# Patient Record
Sex: MALE | Race: WHITE | NOT HISPANIC OR LATINO | Employment: OTHER | URBAN - METROPOLITAN AREA
[De-identification: names, ages, dates, MRNs, and addresses within clinical notes are randomized per-mention and may not be internally consistent; named-entity substitution may affect disease eponyms.]

---

## 2021-02-11 ENCOUNTER — TELEMEDICINE (OUTPATIENT)
Dept: INTERNAL MEDICINE | Facility: CLINIC | Age: 66
End: 2021-02-11

## 2021-02-11 VITALS
SYSTOLIC BLOOD PRESSURE: 139 MMHG | WEIGHT: 215 LBS | BODY MASS INDEX: 26.73 KG/M2 | HEIGHT: 75 IN | DIASTOLIC BLOOD PRESSURE: 90 MMHG

## 2021-02-11 DIAGNOSIS — I10 ESSENTIAL HYPERTENSION: ICD-10-CM

## 2021-02-11 DIAGNOSIS — R14.0 BLOATING: ICD-10-CM

## 2021-02-11 DIAGNOSIS — Z86.19 HISTORY OF SHINGLES: ICD-10-CM

## 2021-02-11 DIAGNOSIS — F32.1 MAJOR DEPRESSIVE DISORDER, SINGLE EPISODE, MODERATE (HCC): ICD-10-CM

## 2021-02-11 DIAGNOSIS — Z11.59 ENCOUNTER FOR HEPATITIS C SCREENING TEST FOR LOW RISK PATIENT: ICD-10-CM

## 2021-02-11 DIAGNOSIS — Z13.1 SCREENING FOR DIABETES MELLITUS: ICD-10-CM

## 2021-02-11 DIAGNOSIS — K21.9 GASTROESOPHAGEAL REFLUX DISEASE WITHOUT ESOPHAGITIS: ICD-10-CM

## 2021-02-11 DIAGNOSIS — N52.9 ERECTILE DYSFUNCTION, UNSPECIFIED ERECTILE DYSFUNCTION TYPE: ICD-10-CM

## 2021-02-11 DIAGNOSIS — Z13.220 SCREENING, LIPID: ICD-10-CM

## 2021-02-11 DIAGNOSIS — F41.9 ANXIETY: Primary | ICD-10-CM

## 2021-02-11 PROCEDURE — 99204 OFFICE O/P NEW MOD 45 MIN: CPT | Performed by: INTERNAL MEDICINE

## 2021-02-11 RX ORDER — SILDENAFIL CITRATE 20 MG/1
20 TABLET ORAL DAILY PRN
COMMUNITY
End: 2021-03-11 | Stop reason: SDUPTHER

## 2021-02-11 RX ORDER — OMEPRAZOLE 20 MG/1
1 CAPSULE, DELAYED RELEASE ORAL DAILY
COMMUNITY
End: 2021-03-11 | Stop reason: SDUPTHER

## 2021-02-11 RX ORDER — SERTRALINE HYDROCHLORIDE 25 MG/1
25 TABLET, FILM COATED ORAL DAILY
Qty: 30 TABLET | Refills: 1 | Status: SHIPPED | OUTPATIENT
Start: 2021-02-11 | End: 2021-03-11 | Stop reason: SDUPTHER

## 2021-02-11 RX ORDER — LISINOPRIL AND HYDROCHLOROTHIAZIDE 25; 20 MG/1; MG/1
1 TABLET ORAL DAILY
COMMUNITY
End: 2021-03-11 | Stop reason: SDUPTHER

## 2021-02-11 RX ORDER — MULTIVIT-MIN/IRON/FOLIC ACID/K 18-600-40
1 CAPSULE ORAL DAILY
COMMUNITY

## 2021-03-11 ENCOUNTER — LAB (OUTPATIENT)
Dept: LAB | Facility: HOSPITAL | Age: 66
End: 2021-03-11

## 2021-03-11 ENCOUNTER — OFFICE VISIT (OUTPATIENT)
Dept: INTERNAL MEDICINE | Facility: CLINIC | Age: 66
End: 2021-03-11

## 2021-03-11 VITALS
HEART RATE: 80 BPM | OXYGEN SATURATION: 99 % | WEIGHT: 211 LBS | DIASTOLIC BLOOD PRESSURE: 80 MMHG | SYSTOLIC BLOOD PRESSURE: 144 MMHG | RESPIRATION RATE: 16 BRPM | TEMPERATURE: 97.1 F | HEIGHT: 75 IN | BODY MASS INDEX: 26.24 KG/M2

## 2021-03-11 DIAGNOSIS — N52.2 DRUG-INDUCED ERECTILE DYSFUNCTION: ICD-10-CM

## 2021-03-11 DIAGNOSIS — F32.1 MAJOR DEPRESSIVE DISORDER, SINGLE EPISODE, MODERATE (HCC): ICD-10-CM

## 2021-03-11 DIAGNOSIS — Z13.1 SCREENING FOR DIABETES MELLITUS: ICD-10-CM

## 2021-03-11 DIAGNOSIS — K21.9 GASTROESOPHAGEAL REFLUX DISEASE WITHOUT ESOPHAGITIS: ICD-10-CM

## 2021-03-11 DIAGNOSIS — I10 ESSENTIAL HYPERTENSION: ICD-10-CM

## 2021-03-11 DIAGNOSIS — Z11.59 ENCOUNTER FOR HEPATITIS C SCREENING TEST FOR LOW RISK PATIENT: ICD-10-CM

## 2021-03-11 DIAGNOSIS — F41.9 ANXIETY: ICD-10-CM

## 2021-03-11 DIAGNOSIS — Z13.220 SCREENING, LIPID: ICD-10-CM

## 2021-03-11 LAB
ALBUMIN SERPL-MCNC: 4.5 G/DL (ref 3.5–5.2)
ALBUMIN/GLOB SERPL: 1.6 G/DL
ALP SERPL-CCNC: 64 U/L (ref 39–117)
ALT SERPL W P-5'-P-CCNC: 18 U/L (ref 1–41)
ANION GAP SERPL CALCULATED.3IONS-SCNC: 8.5 MMOL/L (ref 5–15)
AST SERPL-CCNC: 12 U/L (ref 1–40)
BILIRUB SERPL-MCNC: 0.5 MG/DL (ref 0–1.2)
BUN SERPL-MCNC: 17 MG/DL (ref 8–23)
BUN/CREAT SERPL: 17.9 (ref 7–25)
CALCIUM SPEC-SCNC: 9.3 MG/DL (ref 8.6–10.5)
CHLORIDE SERPL-SCNC: 100 MMOL/L (ref 98–107)
CHOLEST SERPL-MCNC: 192 MG/DL (ref 0–200)
CO2 SERPL-SCNC: 26.5 MMOL/L (ref 22–29)
CREAT SERPL-MCNC: 0.95 MG/DL (ref 0.76–1.27)
DEPRECATED RDW RBC AUTO: 39.8 FL (ref 37–54)
ERYTHROCYTE [DISTWIDTH] IN BLOOD BY AUTOMATED COUNT: 12.3 % (ref 12.3–15.4)
GFR SERPL CREATININE-BSD FRML MDRD: 80 ML/MIN/1.73
GLOBULIN UR ELPH-MCNC: 2.8 GM/DL
GLUCOSE SERPL-MCNC: 97 MG/DL (ref 65–99)
HBA1C MFR BLD: 5.51 % (ref 4.8–5.6)
HCT VFR BLD AUTO: 45.6 % (ref 37.5–51)
HDLC SERPL-MCNC: 42 MG/DL (ref 40–60)
HGB BLD-MCNC: 16.1 G/DL (ref 13–17.7)
LDLC SERPL CALC-MCNC: 138 MG/DL (ref 0–100)
LDLC/HDLC SERPL: 3.28 {RATIO}
MCH RBC QN AUTO: 31.8 PG (ref 26.6–33)
MCHC RBC AUTO-ENTMCNC: 35.3 G/DL (ref 31.5–35.7)
MCV RBC AUTO: 89.9 FL (ref 79–97)
PLATELET # BLD AUTO: 276 10*3/MM3 (ref 140–450)
PMV BLD AUTO: 9.8 FL (ref 6–12)
POTASSIUM SERPL-SCNC: 4.5 MMOL/L (ref 3.5–5.2)
PROT SERPL-MCNC: 7.3 G/DL (ref 6–8.5)
RBC # BLD AUTO: 5.07 10*6/MM3 (ref 4.14–5.8)
SODIUM SERPL-SCNC: 135 MMOL/L (ref 136–145)
TRIGL SERPL-MCNC: 62 MG/DL (ref 0–150)
VLDLC SERPL-MCNC: 12 MG/DL (ref 5–40)
WBC # BLD AUTO: 5.7 10*3/MM3 (ref 3.4–10.8)

## 2021-03-11 PROCEDURE — 80061 LIPID PANEL: CPT

## 2021-03-11 PROCEDURE — 80053 COMPREHEN METABOLIC PANEL: CPT

## 2021-03-11 PROCEDURE — 86803 HEPATITIS C AB TEST: CPT

## 2021-03-11 PROCEDURE — 85027 COMPLETE CBC AUTOMATED: CPT

## 2021-03-11 PROCEDURE — 83036 HEMOGLOBIN GLYCOSYLATED A1C: CPT

## 2021-03-11 PROCEDURE — 99214 OFFICE O/P EST MOD 30 MIN: CPT | Performed by: INTERNAL MEDICINE

## 2021-03-11 RX ORDER — ACYCLOVIR 800 MG/1
TABLET ORAL
COMMUNITY
Start: 2021-02-28 | End: 2021-03-11

## 2021-03-11 RX ORDER — SILDENAFIL CITRATE 20 MG/1
20 TABLET ORAL DAILY PRN
Qty: 30 TABLET | Refills: 1 | Status: SHIPPED | OUTPATIENT
Start: 2021-03-11 | End: 2022-04-22

## 2021-03-11 RX ORDER — OMEPRAZOLE 40 MG/1
40 CAPSULE, DELAYED RELEASE ORAL DAILY
Qty: 90 CAPSULE | Refills: 1 | Status: SHIPPED | OUTPATIENT
Start: 2021-03-11 | End: 2021-11-16

## 2021-03-11 RX ORDER — LISINOPRIL AND HYDROCHLOROTHIAZIDE 25; 20 MG/1; MG/1
1 TABLET ORAL DAILY
Qty: 90 TABLET | Refills: 1 | Status: SHIPPED | OUTPATIENT
Start: 2021-03-11 | End: 2022-07-27

## 2021-03-12 LAB — HCV AB SER DONR QL: NORMAL

## 2021-03-15 ENCOUNTER — APPOINTMENT (OUTPATIENT)
Dept: PREADMISSION TESTING | Facility: HOSPITAL | Age: 66
End: 2021-03-15

## 2021-03-16 ENCOUNTER — APPOINTMENT (OUTPATIENT)
Dept: PREADMISSION TESTING | Facility: HOSPITAL | Age: 66
End: 2021-03-16

## 2021-03-16 PROCEDURE — U0004 COV-19 TEST NON-CDC HGH THRU: HCPCS

## 2021-03-16 PROCEDURE — C9803 HOPD COVID-19 SPEC COLLECT: HCPCS

## 2021-03-17 LAB — SARS-COV-2 RNA NOSE QL NAA+PROBE: NOT DETECTED

## 2021-03-18 ENCOUNTER — OUTSIDE FACILITY SERVICE (OUTPATIENT)
Dept: GASTROENTEROLOGY | Facility: CLINIC | Age: 66
End: 2021-03-18

## 2021-03-18 PROCEDURE — 43251 EGD REMOVE LESION SNARE: CPT | Performed by: INTERNAL MEDICINE

## 2021-03-18 PROCEDURE — 43249 ESOPH EGD DILATION <30 MM: CPT | Performed by: INTERNAL MEDICINE

## 2021-03-18 PROCEDURE — 43239 EGD BIOPSY SINGLE/MULTIPLE: CPT | Performed by: INTERNAL MEDICINE

## 2021-03-18 PROCEDURE — 88305 TISSUE EXAM BY PATHOLOGIST: CPT | Performed by: INTERNAL MEDICINE

## 2021-03-19 ENCOUNTER — LAB REQUISITION (OUTPATIENT)
Dept: LAB | Facility: HOSPITAL | Age: 66
End: 2021-03-19

## 2021-03-19 DIAGNOSIS — K21.9 GASTRO-ESOPHAGEAL REFLUX DISEASE WITHOUT ESOPHAGITIS: ICD-10-CM

## 2021-03-22 LAB
CYTO UR: NORMAL
LAB AP CASE REPORT: NORMAL
LAB AP CLINICAL INFORMATION: NORMAL
PATH REPORT.FINAL DX SPEC: NORMAL
PATH REPORT.GROSS SPEC: NORMAL

## 2021-03-23 DIAGNOSIS — B02.9 HERPES ZOSTER WITHOUT COMPLICATION: Primary | ICD-10-CM

## 2021-03-23 RX ORDER — FAMCICLOVIR 500 MG/1
500 TABLET ORAL 3 TIMES DAILY
Qty: 21 TABLET | Refills: 0 | Status: SHIPPED | OUTPATIENT
Start: 2021-03-23 | End: 2021-03-30

## 2021-09-09 DIAGNOSIS — F32.1 MAJOR DEPRESSIVE DISORDER, SINGLE EPISODE, MODERATE (HCC): ICD-10-CM

## 2021-09-09 DIAGNOSIS — F41.9 ANXIETY: ICD-10-CM

## 2021-09-15 ENCOUNTER — OFFICE VISIT (OUTPATIENT)
Dept: INTERNAL MEDICINE | Facility: CLINIC | Age: 66
End: 2021-09-15

## 2021-09-15 ENCOUNTER — TELEPHONE (OUTPATIENT)
Dept: INTERNAL MEDICINE | Facility: CLINIC | Age: 66
End: 2021-09-15

## 2021-09-15 VITALS
TEMPERATURE: 97.1 F | SYSTOLIC BLOOD PRESSURE: 142 MMHG | DIASTOLIC BLOOD PRESSURE: 82 MMHG | RESPIRATION RATE: 16 BRPM | OXYGEN SATURATION: 99 % | BODY MASS INDEX: 27.88 KG/M2 | HEIGHT: 75 IN | HEART RATE: 74 BPM | WEIGHT: 224.2 LBS

## 2021-09-15 DIAGNOSIS — I10 ESSENTIAL HYPERTENSION: ICD-10-CM

## 2021-09-15 DIAGNOSIS — Z12.11 SCREENING FOR MALIGNANT NEOPLASM OF COLON: ICD-10-CM

## 2021-09-15 DIAGNOSIS — B36.0 TINEA VERSICOLOR: ICD-10-CM

## 2021-09-15 DIAGNOSIS — N48.1 BALANITIS: ICD-10-CM

## 2021-09-15 DIAGNOSIS — F41.9 ANXIETY: ICD-10-CM

## 2021-09-15 DIAGNOSIS — F32.1 MAJOR DEPRESSIVE DISORDER, SINGLE EPISODE, MODERATE (HCC): ICD-10-CM

## 2021-09-15 DIAGNOSIS — Z86.19 HISTORY OF SHINGLES: Primary | ICD-10-CM

## 2021-09-15 PROCEDURE — 99214 OFFICE O/P EST MOD 30 MIN: CPT | Performed by: INTERNAL MEDICINE

## 2021-09-15 RX ORDER — FAMCICLOVIR 500 MG/1
500 TABLET ORAL AS NEEDED
COMMUNITY
Start: 2021-03-22 | End: 2021-09-15 | Stop reason: SDUPTHER

## 2021-09-15 RX ORDER — CLOTRIMAZOLE 1 %
1 CREAM (GRAM) TOPICAL 2 TIMES DAILY
Qty: 24 G | Refills: 0 | Status: SHIPPED | OUTPATIENT
Start: 2021-09-15 | End: 2021-11-16

## 2021-09-15 RX ORDER — NEOMYCIN SULFATE, POLYMYXIN B SULFATE, HYDROCORTISONE 3.5; 10000; 1 MG/ML; [USP'U]/ML; MG/ML
SOLUTION/ DROPS AURICULAR (OTIC)
COMMUNITY
Start: 2021-09-03 | End: 2021-11-16

## 2021-09-15 RX ORDER — ACYCLOVIR 800 MG/1
800 TABLET ORAL AS NEEDED
COMMUNITY
Start: 2021-09-07 | End: 2021-09-15

## 2021-09-15 RX ORDER — FAMCICLOVIR 500 MG/1
500 TABLET ORAL AS NEEDED
Qty: 30 TABLET | Refills: 0 | Status: SHIPPED | OUTPATIENT
Start: 2021-09-15 | End: 2021-11-23

## 2021-09-24 ENCOUNTER — PATIENT MESSAGE (OUTPATIENT)
Dept: INTERNAL MEDICINE | Facility: CLINIC | Age: 66
End: 2021-09-24

## 2021-09-24 DIAGNOSIS — N48.1 BALANITIS: Primary | ICD-10-CM

## 2021-09-26 RX ORDER — DIAPER,BRIEF,INFANT-TODD,DISP
1 EACH MISCELLANEOUS 2 TIMES DAILY
Qty: 56 G | Refills: 0 | Status: SHIPPED | OUTPATIENT
Start: 2021-09-26

## 2021-09-27 DIAGNOSIS — Z12.11 ENCOUNTER FOR SCREENING COLONOSCOPY: Primary | ICD-10-CM

## 2021-11-16 ENCOUNTER — OFFICE VISIT (OUTPATIENT)
Dept: INTERNAL MEDICINE | Facility: CLINIC | Age: 66
End: 2021-11-16

## 2021-11-16 ENCOUNTER — LAB (OUTPATIENT)
Dept: LAB | Facility: HOSPITAL | Age: 66
End: 2021-11-16

## 2021-11-16 VITALS
OXYGEN SATURATION: 99 % | HEIGHT: 75 IN | WEIGHT: 228 LBS | TEMPERATURE: 96.9 F | DIASTOLIC BLOOD PRESSURE: 72 MMHG | BODY MASS INDEX: 28.35 KG/M2 | RESPIRATION RATE: 16 BRPM | SYSTOLIC BLOOD PRESSURE: 142 MMHG | HEART RATE: 75 BPM

## 2021-11-16 DIAGNOSIS — F41.9 ANXIETY: ICD-10-CM

## 2021-11-16 DIAGNOSIS — Z00.00 ANNUAL PHYSICAL EXAM: Primary | ICD-10-CM

## 2021-11-16 DIAGNOSIS — I10 ESSENTIAL HYPERTENSION: ICD-10-CM

## 2021-11-16 DIAGNOSIS — Z12.5 PROSTATE CANCER SCREENING: ICD-10-CM

## 2021-11-16 DIAGNOSIS — E78.5 DYSLIPIDEMIA: ICD-10-CM

## 2021-11-16 DIAGNOSIS — N52.2 DRUG-INDUCED ERECTILE DYSFUNCTION: ICD-10-CM

## 2021-11-16 DIAGNOSIS — Z86.19 HISTORY OF SHINGLES: ICD-10-CM

## 2021-11-16 DIAGNOSIS — K21.9 GASTROESOPHAGEAL REFLUX DISEASE WITHOUT ESOPHAGITIS: ICD-10-CM

## 2021-11-16 DIAGNOSIS — Z00.00 ANNUAL PHYSICAL EXAM: ICD-10-CM

## 2021-11-16 DIAGNOSIS — F32.5 MAJOR DEPRESSIVE DISORDER WITH SINGLE EPISODE, IN FULL REMISSION (HCC): ICD-10-CM

## 2021-11-16 PROBLEM — B36.0 TINEA VERSICOLOR: Status: ACTIVE | Noted: 2021-11-16

## 2021-11-16 LAB
ALBUMIN SERPL-MCNC: 4.8 G/DL (ref 3.5–5.2)
ALBUMIN/GLOB SERPL: 1.7 G/DL
ALP SERPL-CCNC: 68 U/L (ref 39–117)
ALT SERPL W P-5'-P-CCNC: 18 U/L (ref 1–41)
ANION GAP SERPL CALCULATED.3IONS-SCNC: 8.8 MMOL/L (ref 5–15)
AST SERPL-CCNC: 19 U/L (ref 1–40)
BILIRUB SERPL-MCNC: 0.4 MG/DL (ref 0–1.2)
BUN SERPL-MCNC: 16 MG/DL (ref 8–23)
BUN/CREAT SERPL: 18.2 (ref 7–25)
CALCIUM SPEC-SCNC: 9.5 MG/DL (ref 8.6–10.5)
CHLORIDE SERPL-SCNC: 99 MMOL/L (ref 98–107)
CHOLEST SERPL-MCNC: 239 MG/DL (ref 0–200)
CO2 SERPL-SCNC: 26.2 MMOL/L (ref 22–29)
CREAT SERPL-MCNC: 0.88 MG/DL (ref 0.76–1.27)
DEPRECATED RDW RBC AUTO: 42.3 FL (ref 37–54)
ERYTHROCYTE [DISTWIDTH] IN BLOOD BY AUTOMATED COUNT: 12.7 % (ref 12.3–15.4)
GFR SERPL CREATININE-BSD FRML MDRD: 87 ML/MIN/1.73
GLOBULIN UR ELPH-MCNC: 2.8 GM/DL
GLUCOSE SERPL-MCNC: 105 MG/DL (ref 65–99)
HCT VFR BLD AUTO: 46.9 % (ref 37.5–51)
HDLC SERPL-MCNC: 51 MG/DL (ref 40–60)
HGB BLD-MCNC: 15.3 G/DL (ref 13–17.7)
LDLC SERPL CALC-MCNC: 180 MG/DL (ref 0–100)
LDLC/HDLC SERPL: 3.49 {RATIO}
MCH RBC QN AUTO: 29.9 PG (ref 26.6–33)
MCHC RBC AUTO-ENTMCNC: 32.6 G/DL (ref 31.5–35.7)
MCV RBC AUTO: 91.6 FL (ref 79–97)
PLATELET # BLD AUTO: 289 10*3/MM3 (ref 140–450)
PMV BLD AUTO: 9.8 FL (ref 6–12)
POTASSIUM SERPL-SCNC: 4.9 MMOL/L (ref 3.5–5.2)
PROT SERPL-MCNC: 7.6 G/DL (ref 6–8.5)
PSA SERPL-MCNC: 2.18 NG/ML (ref 0–4)
RBC # BLD AUTO: 5.12 10*6/MM3 (ref 4.14–5.8)
SODIUM SERPL-SCNC: 134 MMOL/L (ref 136–145)
TRIGL SERPL-MCNC: 49 MG/DL (ref 0–150)
VLDLC SERPL-MCNC: 8 MG/DL (ref 5–40)
WBC # BLD AUTO: 5.88 10*3/MM3 (ref 3.4–10.8)

## 2021-11-16 PROCEDURE — 99396 PREV VISIT EST AGE 40-64: CPT | Performed by: INTERNAL MEDICINE

## 2021-11-16 PROCEDURE — 80061 LIPID PANEL: CPT

## 2021-11-16 PROCEDURE — 80053 COMPREHEN METABOLIC PANEL: CPT

## 2021-11-16 PROCEDURE — 85027 COMPLETE CBC AUTOMATED: CPT

## 2021-11-16 PROCEDURE — G0103 PSA SCREENING: HCPCS

## 2021-11-16 RX ORDER — OMEPRAZOLE 20 MG/1
20 CAPSULE, DELAYED RELEASE ORAL DAILY
COMMUNITY
End: 2022-07-27

## 2021-11-22 ENCOUNTER — TELEPHONE (OUTPATIENT)
Dept: INTERNAL MEDICINE | Facility: CLINIC | Age: 66
End: 2021-11-22

## 2021-11-23 RX ORDER — FAMCICLOVIR 500 MG/1
500 TABLET ORAL 3 TIMES DAILY
COMMUNITY
End: 2022-11-21 | Stop reason: SDUPTHER

## 2021-12-20 ENCOUNTER — TELEMEDICINE (OUTPATIENT)
Dept: INTERNAL MEDICINE | Facility: CLINIC | Age: 66
End: 2021-12-20

## 2021-12-20 DIAGNOSIS — R05.8 POST-VIRAL COUGH SYNDROME: Primary | ICD-10-CM

## 2021-12-20 PROCEDURE — 99213 OFFICE O/P EST LOW 20 MIN: CPT | Performed by: STUDENT IN AN ORGANIZED HEALTH CARE EDUCATION/TRAINING PROGRAM

## 2021-12-20 RX ORDER — DEXTROMETHORPHAN HYDROBROMIDE AND PROMETHAZINE HYDROCHLORIDE 15; 6.25 MG/5ML; MG/5ML
5 SYRUP ORAL 4 TIMES DAILY PRN
Qty: 118 ML | Refills: 0 | Status: SHIPPED | OUTPATIENT
Start: 2021-12-20 | End: 2022-07-27

## 2022-03-08 DIAGNOSIS — F32.1 MAJOR DEPRESSIVE DISORDER, SINGLE EPISODE, MODERATE: ICD-10-CM

## 2022-03-08 DIAGNOSIS — F41.9 ANXIETY: ICD-10-CM

## 2022-04-22 DIAGNOSIS — N52.2 DRUG-INDUCED ERECTILE DYSFUNCTION: ICD-10-CM

## 2022-04-22 RX ORDER — SILDENAFIL CITRATE 20 MG/1
TABLET ORAL
Qty: 30 TABLET | Refills: 1 | Status: SHIPPED | OUTPATIENT
Start: 2022-04-22

## 2022-06-10 ENCOUNTER — TELEPHONE (OUTPATIENT)
Dept: INTERNAL MEDICINE | Facility: CLINIC | Age: 67
End: 2022-06-10

## 2022-06-10 ENCOUNTER — PATIENT MESSAGE (OUTPATIENT)
Dept: INTERNAL MEDICINE | Facility: CLINIC | Age: 67
End: 2022-06-10

## 2022-06-10 DIAGNOSIS — B02.9 HERPES ZOSTER WITHOUT COMPLICATION: Primary | ICD-10-CM

## 2022-06-10 RX ORDER — VALACYCLOVIR HYDROCHLORIDE 1 G/1
1000 TABLET, FILM COATED ORAL 3 TIMES DAILY
Qty: 21 TABLET | Refills: 0 | Status: SHIPPED | OUTPATIENT
Start: 2022-06-10 | End: 2022-06-17

## 2022-07-11 ENCOUNTER — PATIENT MESSAGE (OUTPATIENT)
Dept: INTERNAL MEDICINE | Facility: CLINIC | Age: 67
End: 2022-07-11

## 2022-07-11 DIAGNOSIS — N48.6 PEYRONIE'S DISEASE: Primary | ICD-10-CM

## 2022-07-27 ENCOUNTER — LAB (OUTPATIENT)
Dept: LAB | Facility: HOSPITAL | Age: 67
End: 2022-07-27

## 2022-07-27 ENCOUNTER — OFFICE VISIT (OUTPATIENT)
Dept: INTERNAL MEDICINE | Facility: CLINIC | Age: 67
End: 2022-07-27

## 2022-07-27 VITALS
TEMPERATURE: 96.9 F | OXYGEN SATURATION: 99 % | SYSTOLIC BLOOD PRESSURE: 130 MMHG | BODY MASS INDEX: 26.12 KG/M2 | WEIGHT: 209 LBS | HEART RATE: 72 BPM | DIASTOLIC BLOOD PRESSURE: 80 MMHG

## 2022-07-27 DIAGNOSIS — Z86.19 HISTORY OF SHINGLES: ICD-10-CM

## 2022-07-27 DIAGNOSIS — F33.1 MODERATE EPISODE OF RECURRENT MAJOR DEPRESSIVE DISORDER: ICD-10-CM

## 2022-07-27 DIAGNOSIS — I10 ESSENTIAL HYPERTENSION: ICD-10-CM

## 2022-07-27 DIAGNOSIS — F41.9 ANXIETY: ICD-10-CM

## 2022-07-27 DIAGNOSIS — M79.10 MYALGIA: Primary | ICD-10-CM

## 2022-07-27 DIAGNOSIS — M79.10 MYALGIA: ICD-10-CM

## 2022-07-27 DIAGNOSIS — N48.6 PEYRONIE'S DISEASE: ICD-10-CM

## 2022-07-27 PROCEDURE — 80053 COMPREHEN METABOLIC PANEL: CPT

## 2022-07-27 PROCEDURE — 82550 ASSAY OF CK (CPK): CPT

## 2022-07-27 PROCEDURE — 99214 OFFICE O/P EST MOD 30 MIN: CPT | Performed by: INTERNAL MEDICINE

## 2022-07-27 PROCEDURE — 83735 ASSAY OF MAGNESIUM: CPT

## 2022-07-27 RX ORDER — ACYCLOVIR 800 MG/1
TABLET ORAL
COMMUNITY
Start: 2022-06-10 | End: 2022-07-27

## 2022-07-28 LAB
ALBUMIN SERPL-MCNC: 4.9 G/DL (ref 3.5–5.2)
ALBUMIN/GLOB SERPL: 2.3 G/DL
ALP SERPL-CCNC: 58 U/L (ref 39–117)
ALT SERPL W P-5'-P-CCNC: 16 U/L (ref 1–41)
ANION GAP SERPL CALCULATED.3IONS-SCNC: 13 MMOL/L (ref 5–15)
AST SERPL-CCNC: 16 U/L (ref 1–40)
BILIRUB SERPL-MCNC: 0.4 MG/DL (ref 0–1.2)
BUN SERPL-MCNC: 11 MG/DL (ref 8–23)
BUN/CREAT SERPL: 13.4 (ref 7–25)
CALCIUM SPEC-SCNC: 9.4 MG/DL (ref 8.6–10.5)
CHLORIDE SERPL-SCNC: 101 MMOL/L (ref 98–107)
CK SERPL-CCNC: 104 U/L (ref 20–200)
CO2 SERPL-SCNC: 25 MMOL/L (ref 22–29)
CREAT SERPL-MCNC: 0.82 MG/DL (ref 0.76–1.27)
EGFRCR SERPLBLD CKD-EPI 2021: 98.1 ML/MIN/1.73
GLOBULIN UR ELPH-MCNC: 2.1 GM/DL
GLUCOSE SERPL-MCNC: 90 MG/DL (ref 65–99)
MAGNESIUM SERPL-MCNC: 2.1 MG/DL (ref 1.6–2.4)
POTASSIUM SERPL-SCNC: 4.2 MMOL/L (ref 3.5–5.2)
PROT SERPL-MCNC: 7 G/DL (ref 6–8.5)
SODIUM SERPL-SCNC: 139 MMOL/L (ref 136–145)

## 2022-09-01 ENCOUNTER — OFFICE VISIT (OUTPATIENT)
Dept: UROLOGY | Facility: CLINIC | Age: 67
End: 2022-09-01

## 2022-09-01 VITALS — WEIGHT: 209 LBS | BODY MASS INDEX: 25.99 KG/M2 | HEIGHT: 75 IN

## 2022-09-01 DIAGNOSIS — N48.6 PEYRONIE'S DISEASE: Primary | ICD-10-CM

## 2022-09-01 PROCEDURE — 99204 OFFICE O/P NEW MOD 45 MIN: CPT | Performed by: STUDENT IN AN ORGANIZED HEALTH CARE EDUCATION/TRAINING PROGRAM

## 2022-11-21 ENCOUNTER — OFFICE VISIT (OUTPATIENT)
Dept: INTERNAL MEDICINE | Facility: CLINIC | Age: 67
End: 2022-11-21

## 2022-11-21 ENCOUNTER — LAB (OUTPATIENT)
Dept: LAB | Facility: HOSPITAL | Age: 67
End: 2022-11-21

## 2022-11-21 VITALS
OXYGEN SATURATION: 99 % | DIASTOLIC BLOOD PRESSURE: 88 MMHG | WEIGHT: 207 LBS | HEART RATE: 87 BPM | HEIGHT: 75 IN | TEMPERATURE: 97.5 F | SYSTOLIC BLOOD PRESSURE: 144 MMHG | BODY MASS INDEX: 25.74 KG/M2

## 2022-11-21 DIAGNOSIS — Z12.5 PROSTATE CANCER SCREENING: ICD-10-CM

## 2022-11-21 DIAGNOSIS — Z00.00 ANNUAL PHYSICAL EXAM: Primary | ICD-10-CM

## 2022-11-21 DIAGNOSIS — Z12.11 SCREENING FOR MALIGNANT NEOPLASM OF COLON: ICD-10-CM

## 2022-11-21 DIAGNOSIS — N52.2 DRUG-INDUCED ERECTILE DYSFUNCTION: ICD-10-CM

## 2022-11-21 DIAGNOSIS — Z86.19 HISTORY OF SHINGLES: ICD-10-CM

## 2022-11-21 DIAGNOSIS — F41.9 ANXIETY: ICD-10-CM

## 2022-11-21 DIAGNOSIS — E78.5 DYSLIPIDEMIA: ICD-10-CM

## 2022-11-21 DIAGNOSIS — I10 ESSENTIAL HYPERTENSION: ICD-10-CM

## 2022-11-21 DIAGNOSIS — N48.6 PEYRONIE'S DISEASE: ICD-10-CM

## 2022-11-21 DIAGNOSIS — F33.1 MODERATE EPISODE OF RECURRENT MAJOR DEPRESSIVE DISORDER: ICD-10-CM

## 2022-11-21 DIAGNOSIS — Z00.00 ANNUAL PHYSICAL EXAM: ICD-10-CM

## 2022-11-21 LAB
ALBUMIN SERPL-MCNC: 4.5 G/DL (ref 3.5–5.2)
ALBUMIN/GLOB SERPL: 1.8 G/DL
ALP SERPL-CCNC: 70 U/L (ref 39–117)
ALT SERPL W P-5'-P-CCNC: 15 U/L (ref 1–41)
ANION GAP SERPL CALCULATED.3IONS-SCNC: 10 MMOL/L (ref 5–15)
AST SERPL-CCNC: 15 U/L (ref 1–40)
BILIRUB SERPL-MCNC: 0.4 MG/DL (ref 0–1.2)
BUN SERPL-MCNC: 13 MG/DL (ref 8–23)
BUN/CREAT SERPL: 14.6 (ref 7–25)
CALCIUM SPEC-SCNC: 9.2 MG/DL (ref 8.6–10.5)
CHLORIDE SERPL-SCNC: 103 MMOL/L (ref 98–107)
CHOLEST SERPL-MCNC: 217 MG/DL (ref 0–200)
CO2 SERPL-SCNC: 26 MMOL/L (ref 22–29)
CREAT SERPL-MCNC: 0.89 MG/DL (ref 0.76–1.27)
DEPRECATED RDW RBC AUTO: 39.9 FL (ref 37–54)
EGFRCR SERPLBLD CKD-EPI 2021: 95.1 ML/MIN/1.73
ERYTHROCYTE [DISTWIDTH] IN BLOOD BY AUTOMATED COUNT: 12.2 % (ref 12.3–15.4)
GLOBULIN UR ELPH-MCNC: 2.5 GM/DL
GLUCOSE SERPL-MCNC: 98 MG/DL (ref 65–99)
HCT VFR BLD AUTO: 43.7 % (ref 37.5–51)
HDLC SERPL-MCNC: 42 MG/DL (ref 40–60)
HGB BLD-MCNC: 15 G/DL (ref 13–17.7)
LDLC SERPL CALC-MCNC: 160 MG/DL (ref 0–100)
LDLC/HDLC SERPL: 3.78 {RATIO}
MCH RBC QN AUTO: 30.7 PG (ref 26.6–33)
MCHC RBC AUTO-ENTMCNC: 34.3 G/DL (ref 31.5–35.7)
MCV RBC AUTO: 89.5 FL (ref 79–97)
PLATELET # BLD AUTO: 258 10*3/MM3 (ref 140–450)
PMV BLD AUTO: 9.7 FL (ref 6–12)
POTASSIUM SERPL-SCNC: 4.3 MMOL/L (ref 3.5–5.2)
PROT SERPL-MCNC: 7 G/DL (ref 6–8.5)
RBC # BLD AUTO: 4.88 10*6/MM3 (ref 4.14–5.8)
SODIUM SERPL-SCNC: 139 MMOL/L (ref 136–145)
TRIGL SERPL-MCNC: 82 MG/DL (ref 0–150)
VLDLC SERPL-MCNC: 15 MG/DL (ref 5–40)
WBC NRBC COR # BLD: 8.39 10*3/MM3 (ref 3.4–10.8)

## 2022-11-21 PROCEDURE — 80061 LIPID PANEL: CPT

## 2022-11-21 PROCEDURE — G0103 PSA SCREENING: HCPCS

## 2022-11-21 PROCEDURE — 80053 COMPREHEN METABOLIC PANEL: CPT

## 2022-11-21 PROCEDURE — 85027 COMPLETE CBC AUTOMATED: CPT

## 2022-11-21 PROCEDURE — 99397 PER PM REEVAL EST PAT 65+ YR: CPT | Performed by: INTERNAL MEDICINE

## 2022-11-21 RX ORDER — FAMCICLOVIR 500 MG/1
500 TABLET ORAL 3 TIMES DAILY
Qty: 21 TABLET | Refills: 0 | Status: SHIPPED | OUTPATIENT
Start: 2022-11-21 | End: 2022-11-28

## 2022-11-22 LAB — PSA SERPL-MCNC: 2 NG/ML (ref 0–4)

## 2022-11-28 ENCOUNTER — TELEPHONE (OUTPATIENT)
Dept: INTERNAL MEDICINE | Facility: CLINIC | Age: 67
End: 2022-11-28

## 2023-01-20 DIAGNOSIS — Z12.11 SCREENING FOR COLON CANCER: Primary | ICD-10-CM

## 2023-02-27 ENCOUNTER — OUTSIDE FACILITY SERVICE (OUTPATIENT)
Dept: GASTROENTEROLOGY | Facility: CLINIC | Age: 68
End: 2023-02-27
Payer: COMMERCIAL

## 2023-02-27 PROCEDURE — 45390 COLONOSCOPY W/RESECTION: CPT | Performed by: INTERNAL MEDICINE

## 2023-02-27 PROCEDURE — 45388 COLONOSCOPY W/ABLATION: CPT | Performed by: INTERNAL MEDICINE

## 2023-02-27 PROCEDURE — 45385 COLONOSCOPY W/LESION REMOVAL: CPT | Performed by: INTERNAL MEDICINE

## 2023-02-27 PROCEDURE — 88305 TISSUE EXAM BY PATHOLOGIST: CPT

## 2023-02-28 ENCOUNTER — LAB REQUISITION (OUTPATIENT)
Dept: LAB | Facility: HOSPITAL | Age: 68
End: 2023-02-28
Payer: COMMERCIAL

## 2023-02-28 DIAGNOSIS — D12.0 BENIGN NEOPLASM OF CECUM: ICD-10-CM

## 2023-02-28 DIAGNOSIS — D12.7 BENIGN NEOPLASM OF RECTOSIGMOID JUNCTION: ICD-10-CM

## 2023-02-28 DIAGNOSIS — D12.2 BENIGN NEOPLASM OF ASCENDING COLON: ICD-10-CM

## 2023-02-28 DIAGNOSIS — Z12.11 ENCOUNTER FOR SCREENING FOR MALIGNANT NEOPLASM OF COLON: ICD-10-CM

## 2023-02-28 DIAGNOSIS — D12.3 BENIGN NEOPLASM OF TRANSVERSE COLON: ICD-10-CM

## 2023-02-28 DIAGNOSIS — K64.8 OTHER HEMORRHOIDS: ICD-10-CM

## 2023-03-02 LAB — REF LAB TEST METHOD: NORMAL

## 2023-08-15 ENCOUNTER — PATIENT MESSAGE (OUTPATIENT)
Dept: INTERNAL MEDICINE | Facility: CLINIC | Age: 68
End: 2023-08-15
Payer: COMMERCIAL

## 2023-08-16 DIAGNOSIS — N52.2 DRUG-INDUCED ERECTILE DYSFUNCTION: ICD-10-CM

## 2023-08-16 RX ORDER — SILDENAFIL CITRATE 20 MG/1
TABLET ORAL
Qty: 30 TABLET | Refills: 0 | Status: SHIPPED | OUTPATIENT
Start: 2023-08-16

## 2023-11-21 ENCOUNTER — PATIENT MESSAGE (OUTPATIENT)
Dept: INTERNAL MEDICINE | Facility: CLINIC | Age: 68
End: 2023-11-21
Payer: COMMERCIAL

## 2023-11-22 ENCOUNTER — TELEPHONE (OUTPATIENT)
Dept: INTERNAL MEDICINE | Facility: CLINIC | Age: 68
End: 2023-11-22
Payer: COMMERCIAL

## 2023-11-22 DIAGNOSIS — Z86.19 HISTORY OF SHINGLES: Primary | ICD-10-CM

## 2023-11-22 RX ORDER — ACYCLOVIR 400 MG/1
800 TABLET ORAL
Qty: 100 TABLET | Refills: 0 | Status: SHIPPED | OUTPATIENT
Start: 2023-11-22 | End: 2023-12-02

## 2024-03-01 DIAGNOSIS — B02.9 HERPES ZOSTER WITHOUT COMPLICATION: ICD-10-CM

## 2024-03-01 DIAGNOSIS — Z86.19 HISTORY OF SHINGLES: ICD-10-CM

## 2024-03-01 RX ORDER — ACYCLOVIR 400 MG/1
TABLET ORAL
Qty: 100 TABLET | Refills: 0 | OUTPATIENT
Start: 2024-03-01

## 2024-03-01 RX ORDER — ACYCLOVIR 800 MG/1
TABLET ORAL
OUTPATIENT
Start: 2024-03-01

## 2025-07-14 ENCOUNTER — TELEPHONE (OUTPATIENT)
Dept: INTERNAL MEDICINE | Facility: CLINIC | Age: 70
End: 2025-07-14
Payer: MEDICARE

## 2025-07-14 NOTE — TELEPHONE ENCOUNTER
Called and spoke to pt. Made apt with PCP for this week per request of pt per availability. Pt voiced deep appreciation.

## 2025-07-16 ENCOUNTER — OFFICE VISIT (OUTPATIENT)
Dept: INTERNAL MEDICINE | Facility: CLINIC | Age: 70
End: 2025-07-16
Payer: MEDICARE

## 2025-07-16 VITALS
SYSTOLIC BLOOD PRESSURE: 144 MMHG | HEART RATE: 78 BPM | WEIGHT: 226.2 LBS | HEIGHT: 75 IN | OXYGEN SATURATION: 98 % | BODY MASS INDEX: 28.12 KG/M2 | TEMPERATURE: 97.5 F | DIASTOLIC BLOOD PRESSURE: 86 MMHG

## 2025-07-16 DIAGNOSIS — Z12.83 SKIN EXAM FOR MALIGNANT NEOPLASM: ICD-10-CM

## 2025-07-16 DIAGNOSIS — K63.5 POLYP OF COLON, UNSPECIFIED PART OF COLON, UNSPECIFIED TYPE: ICD-10-CM

## 2025-07-16 DIAGNOSIS — R07.9 CHEST PAIN, UNSPECIFIED TYPE: ICD-10-CM

## 2025-07-16 DIAGNOSIS — Z12.5 PROSTATE CANCER SCREENING: ICD-10-CM

## 2025-07-16 DIAGNOSIS — S46.211S TEAR OF RIGHT BICEPS MUSCLE, SEQUELA: ICD-10-CM

## 2025-07-16 DIAGNOSIS — E78.5 DYSLIPIDEMIA: ICD-10-CM

## 2025-07-16 DIAGNOSIS — I10 ESSENTIAL HYPERTENSION: Primary | ICD-10-CM

## 2025-07-16 DIAGNOSIS — S46.011S TRAUMATIC COMPLETE TEAR OF RIGHT ROTATOR CUFF, SEQUELA: ICD-10-CM

## 2025-07-16 PROBLEM — S46.011A TRAUMATIC COMPLETE TEAR OF RIGHT ROTATOR CUFF: Status: ACTIVE | Noted: 2025-07-16

## 2025-07-16 PROBLEM — S46.211A TEAR OF RIGHT BICEPS MUSCLE: Status: ACTIVE | Noted: 2025-07-16

## 2025-07-16 PROCEDURE — 3077F SYST BP >= 140 MM HG: CPT | Performed by: INTERNAL MEDICINE

## 2025-07-16 PROCEDURE — 1159F MED LIST DOCD IN RCRD: CPT | Performed by: INTERNAL MEDICINE

## 2025-07-16 PROCEDURE — 1126F AMNT PAIN NOTED NONE PRSNT: CPT | Performed by: INTERNAL MEDICINE

## 2025-07-16 PROCEDURE — 99214 OFFICE O/P EST MOD 30 MIN: CPT | Performed by: INTERNAL MEDICINE

## 2025-07-16 PROCEDURE — G2211 COMPLEX E/M VISIT ADD ON: HCPCS | Performed by: INTERNAL MEDICINE

## 2025-07-16 PROCEDURE — 1160F RVW MEDS BY RX/DR IN RCRD: CPT | Performed by: INTERNAL MEDICINE

## 2025-07-16 PROCEDURE — 3079F DIAST BP 80-89 MM HG: CPT | Performed by: INTERNAL MEDICINE

## 2025-07-16 RX ORDER — LISINOPRIL 10 MG/1
10 TABLET ORAL DAILY
Qty: 90 TABLET | Refills: 0 | Status: SHIPPED | OUTPATIENT
Start: 2025-07-16

## 2025-07-16 NOTE — PROGRESS NOTES
Internal Medicine Acute Visit    Chief Complaint   Patient presents with    Hypertension        HPI  Mr. Wade comes in today for HTN. He was last seen in 2022. 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 He came back about 4-5 months ago to help his wife who has recently had hip replacement and is going to have another soon. He notes that he had gotten off a phone call with an upset customer on Friday, 7/11. He was not upset or stressed after the call. He noticed chest pressure and SOA. He went to [facility] ED that night and BP was 210/170 on arrival per his report. He was given clonidine 0.2mg once and BP did improve to 169/97. He had d dimer, troponin, CMP, CBC, ECG and CXR. Nothing remarkable found. Home BP has been 140s/80-90s since leaving the ED. He has not had any more chest pain or SOA. He does note BP slightly lower this morning after skipping glass of red wine last night. He does drink a latte once a daily.    He notes that he tore his biceps and rotator cuff on the right about 2y ago while fishing. He recalls the incident resulting in swelling, bruising. He denies any pain or limited ROM. He did consult with , Dr. Bryant, who recommended shoulder replacement due to concurrent OA. He has held off but feels that he may schedule this soon.    He had colonoscopy in 2023 with 10 polyps. Was due to repeat in 2024 but did not due to being out of town.    He is interested in a skin exam. Has hx of sun exposure.         Review of Systems  Review of Systems   Constitutional: Negative.    Respiratory: Negative.     Cardiovascular: Negative.    Gastrointestinal:  Negative for anal bleeding and blood in stool.   Genitourinary:  Positive for difficulty urinating (weaker stream). Negative for decreased urine volume, dysuria and hematuria.   Musculoskeletal:  Negative for arthralgias.        Medications  Current Outpatient Medications on File Prior to Visit   Medication Sig Dispense Refill    Cholecalciferol  "(Vitamin D) 50 MCG (2000 UT) capsule Take 1 capsule by mouth Daily.      CREATINE MONOHYDRATE PO Take  by mouth.      Lysine 1000 MG tablet Take 1 tablet by mouth Daily.      MAGNESIUM COMPLEX HIGH POTENCY PO Take  by mouth.      Menaquinone-7 (K2 PO) Take  by mouth.      [DISCONTINUED] Ascorbic Acid (VITAMIN C PO) Take  by mouth.      [DISCONTINUED] hydrocortisone 1 % ointment Apply 1 application topically to the appropriate area as directed 2 (Two) Times a Day. 56 g 0    [DISCONTINUED] lysine 500 MG tablet Take 500 tablets by mouth Daily.      [DISCONTINUED] sildenafil (REVATIO) 20 MG tablet TAKE 1 TABLET BY MOUTH DAILY AS NEEDED FOR ERECTILE DYSFUNCTION 30 tablet 0    [DISCONTINUED] Sod Picosulfate-Mag Ox-Cit Acd 10-3.5-12 MG-GM -GM/160ML solution Take 160 mL by mouth Take As Directed for 2 doses. 320 mL 0    [DISCONTINUED] TURMERIC CURCUMIN PO Take  by mouth.       No current facility-administered medications on file prior to visit.        Allergies  No Known Allergies    PMH  Past Medical History:   Diagnosis Date    ADHD (attention deficit hyperactivity disorder) child    Anxiety     Diabetes mellitus     Erectile dysfunction     A little    GERD (gastroesophageal reflux disease)     Hyperlipidemia     LDL    Hypertension Year ago    It goes up amd down       Objective  Visit Vitals  /86 (BP Location: Left arm, Patient Position: Sitting)   Pulse 78   Temp 97.5 °F (36.4 °C) (Temporal)   Ht 190.5 cm (75\")   Wt 103 kg (226 lb 3.2 oz)   SpO2 98%   BMI 28.27 kg/m²        Physical Exam  Physical Exam  Vitals and nursing note reviewed.   Constitutional:       General: He is not in acute distress.     Appearance: He is well-developed. He is not ill-appearing or toxic-appearing.   HENT:      Head: Normocephalic and atraumatic.   Eyes:      Conjunctiva/sclera: Conjunctivae normal.   Cardiovascular:      Rate and Rhythm: Normal rate and regular rhythm.      Heart sounds: Normal heart sounds. No murmur " "heard.  Pulmonary:      Effort: Pulmonary effort is normal. No respiratory distress.      Breath sounds: Normal breath sounds.   Musculoskeletal:         General: Deformity (groove in R shoulder and \"betsy\" appearance of R bicep) present. Normal range of motion.      Right lower leg: No edema.      Left lower leg: No edema.   Skin:     General: Skin is warm and dry.   Neurological:      General: No focal deficit present.      Mental Status: He is alert and oriented to person, place, and time.      Gait: Gait normal.   Psychiatric:         Mood and Affect: Mood normal.         Behavior: Behavior normal.         Thought Content: Thought content normal.         Judgment: Judgment normal.         Results  Results for orders placed or performed in visit on 23   TISSUE EXAM, [facility]    Collection Time: 23  2:44 PM    Specimen: Tissue   Result Value Ref Range    Reference Lab Report       [facility]  [address] [place] [address]  Phone: [phone] or [phone]  Fax: [phone]  Reese Bran M.D., Medical Director    PATIENT NAME                                     LABORATORY NO.  [id number]   DEVORA GARG                                [id number]  [id number]                                 AGE                    SEX   SSN              CLIENT REF #  [place] [id number]        1955           [id number]      [id number]    [address]                      REQUESTING M.D.           ATTENDING [place] [address]                        MIQUEL COTA MEGAN  DATE COLLECTED            DATE RECEIVED          DATE REPORTED  2023    DIAGNOSIS:  A.     CECUM POLYP:  Sessile serrated lesion/polyp, negative for cytologic atypia/dysplasia  B.     ASCENDING COLON POLYP:  Sessile serrated  lesion/polyp, negative for cytologic " "atypia/dysplasia  C.     TRANSVERSE COLON POLYP:  Sessile serrated lesion/polyp, negative for cytologic atypia/dysplasia    [facility]    CLINICAL HISTORY:  Encounter for screening for malignant neoplasm of colon, other hemorrhoids    SPECIMENS RECEIVED:  A.    CECUM POLYP  B.    ASCENDING COLON POLYP  C.    TRANSVERSE COLON POLYP    MICROSCOPIC DESCRIPTION:  Tissue blocks are prepared and slides are examined microscopically on all  specimens. See diagnosis for details.    Professional interpretation rendered by Caro Jefferson D.O. at [facility], [address] [place] [address].    GROSS DESCRIPTION:  A.    Labeled \"cecum polyp\" consists of a single portion of tan soft tissue  measuring 0.9 x 0.6 x 0.2 cm.  Specimen is trisected and submitted entirely  in 1 block.  BKO  B.    Labeled \"ascending colon polyp\" consists of multiple pieces of tan soft  tissue measuring 1.6 x 0.8 x 0.4 cm in aggregate.  Specimen is filtered and  submitted entirely  in 1 block.  C.    Labeled \"transverse colon polyp\" consists of multiple pieces of tan soft  tissue measuring 1.3 x 0.8 x 0.2 cm in aggregate.  Submitted entirely in 1  block.  BKO    REVIEWED, DIAGNOSED AND ELECTRONICALLY  SIGNED BY:    Caro Jefferson D.O.  CPT CODES:  88305x3          Assessment and Plan  Diagnoses and all orders for this visit:    Essential hypertension  - uncontrolled  - start lisinopril 10mg daily  - BMP in 1 week    Chest pain, unspecified type  - negative workup in ED recently, likely related to uncontrolled HTN  - no recurrence but can consider further cardiac workup at future visit due to risk factors    Dyslipidemia  - untreated, does exercise regularly and eats a healthy diet  - update lipids    Polyp of colon, unspecified part of colon, unspecified type  - 10 polyps noted in 2023, due for repeat in 2024. Ordered today.    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 Bryant, with eventual plan for shoulder replacement however would defer while working on BP control especially since he is essentially asymptomatic    Prostate cancer screening  -     PSA Screen; Future    Skin exam for malignant neoplasm  -     Ambulatory Referral to Dermatology    Health Maintenance  - Colonoscopy: 2/2023, repeat 1y. Ordered.  - HCV: negative   - Immunizations: Discuss next visit  - Depression screening: screen next visit    Return in about 2 weeks (around 7/30/2025) for Annual (any open 30 minutes) if able.